# Patient Record
Sex: FEMALE | Race: WHITE
[De-identification: names, ages, dates, MRNs, and addresses within clinical notes are randomized per-mention and may not be internally consistent; named-entity substitution may affect disease eponyms.]

---

## 2017-08-24 ENCOUNTER — HOSPITAL ENCOUNTER (EMERGENCY)
Dept: HOSPITAL 1 - ED | Age: 67
Discharge: HOME | End: 2017-08-24
Payer: MEDICARE

## 2017-08-24 VITALS — DIASTOLIC BLOOD PRESSURE: 77 MMHG | SYSTOLIC BLOOD PRESSURE: 115 MMHG

## 2017-08-24 DIAGNOSIS — Z79.899: ICD-10-CM

## 2017-08-24 DIAGNOSIS — Z79.84: ICD-10-CM

## 2017-08-24 DIAGNOSIS — E11.40: ICD-10-CM

## 2017-08-24 DIAGNOSIS — R59.0: Primary | ICD-10-CM

## 2017-08-24 LAB
ALBUMIN SERPL-MCNC: 3.8 G/DL (ref 3.4–5)
ALP SERPL-CCNC: 51 U/L (ref 46–116)
ALT SERPL-CCNC: 64 U/L (ref 14–59)
AST SERPL-CCNC: 60 U/L (ref 15–37)
BASOPHILS NFR BLD: 0.6 % (ref 0–2)
BILIRUB SERPL-MCNC: 0.47 MG/DL (ref 0.2–1)
BUN SERPL-MCNC: 9 MG/DL (ref 7–18)
CALCIUM SERPL-MCNC: 9.5 MG/DL (ref 8.5–10.1)
CHLORIDE SERPL-SCNC: 103 MMOL/L (ref 98–107)
CO2 SERPL-SCNC: 27.4 MMOL/L (ref 21–32)
CREAT SERPL-MCNC: 0.7 MG/DL (ref 0.6–1)
ERYTHROCYTE [DISTWIDTH] IN BLOOD BY AUTOMATED COUNT: 15.5 % (ref 11.5–14.5)
GFR SERPLBLD BASED ON 1.73 SQ M-ARVRAT: > 60 ML/MIN
GLUCOSE SERPL-MCNC: 82 MG/DL (ref 74–106)
MICROSCOPIC UR-IMP: YES
PLATELET # BLD: 247 X10^3MCL (ref 130–400)
POTASSIUM SERPL-SCNC: 4 MMOL/L (ref 3.5–5.1)
PROT SERPL-MCNC: 8.1 G/DL (ref 6.4–8.2)
RBC # UR STRIP.AUTO: NEGATIVE /UL
SODIUM SERPL-SCNC: 141 MMOL/L (ref 136–145)
UA SPECIFIC GRAVITY: <=1.005 (ref 1–1.03)

## 2017-10-23 ENCOUNTER — HOSPITAL ENCOUNTER (EMERGENCY)
Dept: HOSPITAL 1 - ED | Age: 67
Discharge: HOME | End: 2017-10-23
Payer: COMMERCIAL

## 2017-10-23 VITALS — SYSTOLIC BLOOD PRESSURE: 99 MMHG | DIASTOLIC BLOOD PRESSURE: 66 MMHG

## 2017-10-23 VITALS — BODY MASS INDEX: 25.11 KG/M2 | WEIGHT: 159.99 LBS | HEIGHT: 67 IN

## 2017-10-23 DIAGNOSIS — E11.40: ICD-10-CM

## 2017-10-23 DIAGNOSIS — E11.649: Primary | ICD-10-CM

## 2017-10-23 DIAGNOSIS — D72.829: ICD-10-CM

## 2017-10-23 LAB
ALBUMIN SERPL-MCNC: 3.8 G/DL (ref 3.4–5)
ALP SERPL-CCNC: 38 U/L (ref 46–116)
ALT SERPL-CCNC: 68 U/L (ref 14–59)
AST SERPL-CCNC: 41 U/L (ref 15–37)
BASOPHILS NFR BLD: 0.2 % (ref 0–2)
BILIRUB SERPL-MCNC: 0.41 MG/DL (ref 0.2–1)
BUN SERPL-MCNC: 19 MG/DL (ref 7–18)
CALCIUM SERPL-MCNC: 8.9 MG/DL (ref 8.5–10.1)
CHLORIDE SERPL-SCNC: 100 MMOL/L (ref 98–107)
CHOLEST SERPL-MCNC: 178 MG/DL (ref ?–200)
CHOLEST/HDLC SERPL: 3.8 MG/DL
CO2 SERPL-SCNC: 23.6 MMOL/L (ref 21–32)
CREAT SERPL-MCNC: 0.9 MG/DL (ref 0.6–1)
ERYTHROCYTE [DISTWIDTH] IN BLOOD BY AUTOMATED COUNT: 15.4 % (ref 11.5–14.5)
GFR SERPLBLD BASED ON 1.73 SQ M-ARVRAT: > 60 ML/MIN
GLUCOSE SERPL-MCNC: 246 MG/DL (ref 74–106)
HDLC SERPL-MCNC: 47 MG/DL (ref 40–60)
LIPASE SERPL-CCNC: 208 IU/L (ref 73–393)
PLATELET # BLD: 253 X10^3MCL (ref 130–400)
POTASSIUM SERPL-SCNC: 4.2 MMOL/L (ref 3.5–5.1)
PROT SERPL-MCNC: 7.3 G/DL (ref 6.4–8.2)
SODIUM SERPL-SCNC: 135 MMOL/L (ref 136–145)
T3 SERPL-MCNC: 1.13 NG/ML
T3RU NFR SERPL: 33 % UPTAKE (ref 30–39)
T4 FREE SERPL-MCNC: 0.99 NG/DL (ref 0.76–1.46)
T4 SERPL-MCNC: 8.7 UG/DL (ref 4.7–13.3)
T4/T3 UPTAKE INDEX SERPL: 2.9 UG/DL (ref 1.4–4.5)
TRIGL SERPL-MCNC: 89 MG/DL (ref ?–150)

## 2018-05-06 ENCOUNTER — HOSPITAL ENCOUNTER (EMERGENCY)
Dept: HOSPITAL 1 - ED | Age: 68
Discharge: HOME | End: 2018-05-06
Payer: COMMERCIAL

## 2018-05-06 VITALS
HEIGHT: 65 IN | BODY MASS INDEX: 26.49 KG/M2 | BODY MASS INDEX: 26.49 KG/M2 | HEIGHT: 65 IN | WEIGHT: 159 LBS | WEIGHT: 159 LBS

## 2018-05-06 VITALS — DIASTOLIC BLOOD PRESSURE: 81 MMHG | SYSTOLIC BLOOD PRESSURE: 128 MMHG

## 2018-05-06 DIAGNOSIS — E78.00: ICD-10-CM

## 2018-05-06 DIAGNOSIS — R60.0: Primary | ICD-10-CM

## 2018-05-06 DIAGNOSIS — E13.42: ICD-10-CM

## 2018-05-06 DIAGNOSIS — Z72.0: ICD-10-CM

## 2018-05-06 DIAGNOSIS — I10: ICD-10-CM

## 2018-10-31 ENCOUNTER — HOSPITAL ENCOUNTER (INPATIENT)
Dept: HOSPITAL 1 - ED | Age: 68
LOS: 1 days | Discharge: HOME | DRG: 205 | End: 2018-11-01
Attending: HOSPITALIST | Admitting: HOSPITALIST
Payer: COMMERCIAL

## 2018-10-31 VITALS
WEIGHT: 162 LBS | HEIGHT: 64 IN | WEIGHT: 162 LBS | HEIGHT: 64 IN | BODY MASS INDEX: 27.66 KG/M2 | BODY MASS INDEX: 27.66 KG/M2

## 2018-10-31 VITALS — DIASTOLIC BLOOD PRESSURE: 61 MMHG | SYSTOLIC BLOOD PRESSURE: 124 MMHG

## 2018-10-31 VITALS — DIASTOLIC BLOOD PRESSURE: 68 MMHG | SYSTOLIC BLOOD PRESSURE: 147 MMHG

## 2018-10-31 VITALS — SYSTOLIC BLOOD PRESSURE: 139 MMHG | DIASTOLIC BLOOD PRESSURE: 67 MMHG

## 2018-10-31 DIAGNOSIS — Z87.891: ICD-10-CM

## 2018-10-31 DIAGNOSIS — Z79.84: ICD-10-CM

## 2018-10-31 DIAGNOSIS — D68.69: ICD-10-CM

## 2018-10-31 DIAGNOSIS — I10: ICD-10-CM

## 2018-10-31 DIAGNOSIS — Z79.82: ICD-10-CM

## 2018-10-31 DIAGNOSIS — E83.39: ICD-10-CM

## 2018-10-31 DIAGNOSIS — N39.0: ICD-10-CM

## 2018-10-31 DIAGNOSIS — G90.9: ICD-10-CM

## 2018-10-31 DIAGNOSIS — E78.5: ICD-10-CM

## 2018-10-31 DIAGNOSIS — N17.0: ICD-10-CM

## 2018-10-31 DIAGNOSIS — E11.59: ICD-10-CM

## 2018-10-31 DIAGNOSIS — M94.0: Primary | ICD-10-CM

## 2018-10-31 DIAGNOSIS — R55: ICD-10-CM

## 2018-10-31 LAB
ALBUMIN SERPL-MCNC: 4 G/DL (ref 3.4–5)
ALP SERPL-CCNC: 66 U/L (ref 46–116)
ALT SERPL-CCNC: 27 U/L (ref 14–59)
AST SERPL-CCNC: 14 U/L (ref 15–37)
BASOPHILS NFR BLD: 0.5 % (ref 0–2)
BILIRUB SERPL-MCNC: 0.35 MG/DL (ref 0.2–1)
BUN SERPL-MCNC: 22 MG/DL (ref 7–18)
CALCIUM SERPL-MCNC: 8.7 MG/DL (ref 8.5–10.1)
CHLORIDE SERPL-SCNC: 104 MMOL/L (ref 98–107)
CHOLEST SERPL-MCNC: 190 MG/DL (ref ?–200)
CHOLEST/HDLC SERPL: 4.3 MG/DL
CO2 SERPL-SCNC: 27.9 MMOL/L (ref 21–32)
CREAT SERPL-MCNC: 1 MG/DL (ref 0.6–1)
ERYTHROCYTE [DISTWIDTH] IN BLOOD BY AUTOMATED COUNT: 17 % (ref 11.5–14.5)
GFR SERPLBLD BASED ON 1.73 SQ M-ARVRAT: 58 ML/MIN
GLUCOSE SERPL-MCNC: 235 MG/DL (ref 74–106)
HDLC SERPL-MCNC: 44 MG/DL (ref 40–60)
MAGNESIUM SERPL-MCNC: 2.2 MG/DL (ref 1.8–2.4)
PHOSPHATE SERPL-MCNC: 2.3 MG/DL (ref 2.5–4.9)
PLATELET # BLD: 278 X10^3MCL (ref 130–400)
POTASSIUM SERPL-SCNC: 3.8 MMOL/L (ref 3.5–5.1)
PROT SERPL-MCNC: 8 G/DL (ref 6.4–8.2)
SODIUM SERPL-SCNC: 144 MMOL/L (ref 136–145)
T3 SERPL-MCNC: 1.76 NG/ML
T3RU NFR SERPL: 31 % UPTAKE (ref 30–39)
T4 FREE SERPL-MCNC: 1.11 NG/DL (ref 0.76–1.46)
T4 SERPL-MCNC: 8.7 UG/DL (ref 4.7–13.3)
T4/T3 UPTAKE INDEX SERPL: 2.7 UG/DL (ref 1.4–4.5)
TRIGL SERPL-MCNC: 156 MG/DL (ref ?–150)

## 2018-11-01 VITALS — SYSTOLIC BLOOD PRESSURE: 144 MMHG | DIASTOLIC BLOOD PRESSURE: 70 MMHG

## 2018-11-01 VITALS — DIASTOLIC BLOOD PRESSURE: 64 MMHG | SYSTOLIC BLOOD PRESSURE: 135 MMHG

## 2018-11-01 VITALS — DIASTOLIC BLOOD PRESSURE: 67 MMHG | SYSTOLIC BLOOD PRESSURE: 133 MMHG

## 2018-11-01 LAB
AMPHETAMINES UR QL SCN: (no result)
BASOPHILS NFR BLD: 0.8 % (ref 0–2)
BUN SERPL-MCNC: 16 MG/DL (ref 7–18)
CALCIUM SERPL-MCNC: 8.8 MG/DL (ref 8.5–10.1)
CHLORIDE SERPL-SCNC: 105 MMOL/L (ref 98–107)
CO2 SERPL-SCNC: 27.1 MMOL/L (ref 21–32)
CREAT SERPL-MCNC: 0.7 MG/DL (ref 0.6–1)
ERYTHROCYTE [DISTWIDTH] IN BLOOD BY AUTOMATED COUNT: 17.2 % (ref 11.5–14.5)
GFR SERPLBLD BASED ON 1.73 SQ M-ARVRAT: > 60 ML/MIN
GLUCOSE SERPL-MCNC: 114 MG/DL (ref 74–106)
MICROSCOPIC UR-IMP: YES
PHOSPHATE SERPL-MCNC: 3.6 MG/DL (ref 2.5–4.9)
PLATELET # BLD: 264 X10^3MCL (ref 130–400)
POTASSIUM SERPL-SCNC: 4.2 MMOL/L (ref 3.5–5.1)
RBC # UR STRIP.AUTO: NEGATIVE /UL
SODIUM SERPL-SCNC: 139 MMOL/L (ref 136–145)
UA SPECIFIC GRAVITY: >=1.03 (ref 1–1.03)

## 2019-08-22 ENCOUNTER — HOSPITAL ENCOUNTER (INPATIENT)
Dept: HOSPITAL 1 - ED | Age: 69
LOS: 2 days | Discharge: HOME | DRG: 392 | End: 2019-08-24
Attending: FAMILY MEDICINE | Admitting: FAMILY MEDICINE
Payer: COMMERCIAL

## 2019-08-22 VITALS — DIASTOLIC BLOOD PRESSURE: 67 MMHG | SYSTOLIC BLOOD PRESSURE: 146 MMHG

## 2019-08-22 VITALS — HEIGHT: 63 IN | WEIGHT: 169 LBS | BODY MASS INDEX: 29.95 KG/M2

## 2019-08-22 VITALS — DIASTOLIC BLOOD PRESSURE: 56 MMHG | SYSTOLIC BLOOD PRESSURE: 126 MMHG

## 2019-08-22 DIAGNOSIS — Z87.891: ICD-10-CM

## 2019-08-22 DIAGNOSIS — D50.9: ICD-10-CM

## 2019-08-22 DIAGNOSIS — E87.1: ICD-10-CM

## 2019-08-22 DIAGNOSIS — E11.65: ICD-10-CM

## 2019-08-22 DIAGNOSIS — Z79.84: ICD-10-CM

## 2019-08-22 DIAGNOSIS — Z79.82: ICD-10-CM

## 2019-08-22 DIAGNOSIS — K52.9: Primary | ICD-10-CM

## 2019-08-22 DIAGNOSIS — E78.5: ICD-10-CM

## 2019-08-22 LAB
ALBUMIN SERPL-MCNC: 4 G/DL (ref 3.4–5)
ALP SERPL-CCNC: 59 U/L (ref 46–116)
ALT SERPL-CCNC: 26 U/L (ref 14–59)
AST SERPL-CCNC: 23 U/L (ref 15–37)
BASOPHILS NFR BLD: 0.6 % (ref 0–2)
BILIRUB SERPL-MCNC: 0.4 MG/DL (ref 0.2–1)
BUN SERPL-MCNC: 10 MG/DL (ref 7–18)
BUN SERPL-MCNC: 9 MG/DL (ref 7–18)
CALCIUM SERPL-MCNC: 8 MG/DL (ref 8.5–10.1)
CALCIUM SERPL-MCNC: 8.3 MG/DL (ref 8.5–10.1)
CHLORIDE SERPL-SCNC: 81 MMOL/L (ref 98–107)
CHLORIDE SERPL-SCNC: 86 MMOL/L (ref 98–107)
CHOLEST SERPL-MCNC: 168 MG/DL (ref ?–200)
CHOLEST/HDLC SERPL: 4.5 MG/DL
CO2 SERPL-SCNC: 20.4 MMOL/L (ref 21–32)
CO2 SERPL-SCNC: 23.4 MMOL/L (ref 21–32)
CREAT SERPL-MCNC: 0.7 MG/DL (ref 0.6–1)
CREAT SERPL-MCNC: 0.7 MG/DL (ref 0.6–1)
ERYTHROCYTE [DISTWIDTH] IN BLOOD BY AUTOMATED COUNT: 17.7 % (ref 11.5–14.5)
GFR SERPLBLD BASED ON 1.73 SQ M-ARVRAT: > 60 ML/MIN
GFR SERPLBLD BASED ON 1.73 SQ M-ARVRAT: > 60 ML/MIN
GLUCOSE SERPL-MCNC: 110 MG/DL (ref 74–106)
GLUCOSE SERPL-MCNC: 150 MG/DL (ref 74–106)
HDLC SERPL-MCNC: 37 MG/DL (ref 40–60)
LIPASE SERPL-CCNC: 90 IU/L (ref 73–393)
PLATELET # BLD: 323 X10^3MCL (ref 130–400)
POTASSIUM SERPL-SCNC: 3.5 MMOL/L (ref 3.5–5.1)
POTASSIUM SERPL-SCNC: 4.2 MMOL/L (ref 3.5–5.1)
PROT SERPL-MCNC: 7.3 G/DL (ref 6.4–8.2)
SODIUM SERPL-SCNC: 116 MMOL/L (ref 136–145)
SODIUM SERPL-SCNC: 122 MMOL/L (ref 136–145)
T3 SERPL-MCNC: 0.95 NG/ML
T3RU NFR SERPL: 34 % UPTAKE (ref 30–39)
T4 FREE SERPL-MCNC: 1.27 NG/DL (ref 0.76–1.46)
T4 SERPL-MCNC: 10.4 UG/DL (ref 4.7–13.3)
T4/T3 UPTAKE INDEX SERPL: 3.5 UG/DL (ref 1.4–4.5)
TRIGL SERPL-MCNC: 272 MG/DL (ref ?–150)

## 2019-08-22 PROCEDURE — G0378 HOSPITAL OBSERVATION PER HR: HCPCS

## 2019-08-23 VITALS — DIASTOLIC BLOOD PRESSURE: 46 MMHG | SYSTOLIC BLOOD PRESSURE: 105 MMHG

## 2019-08-23 VITALS — SYSTOLIC BLOOD PRESSURE: 136 MMHG | DIASTOLIC BLOOD PRESSURE: 50 MMHG

## 2019-08-23 VITALS — SYSTOLIC BLOOD PRESSURE: 106 MMHG | DIASTOLIC BLOOD PRESSURE: 54 MMHG

## 2019-08-23 VITALS — SYSTOLIC BLOOD PRESSURE: 116 MMHG | DIASTOLIC BLOOD PRESSURE: 58 MMHG

## 2019-08-23 LAB
BASOPHILS NFR BLD: 0.7 % (ref 0–2)
BUN SERPL-MCNC: 10 MG/DL (ref 7–18)
BUN SERPL-MCNC: 9 MG/DL (ref 7–18)
CALCIUM SERPL-MCNC: 7.7 MG/DL (ref 8.5–10.1)
CALCIUM SERPL-MCNC: 8.6 MG/DL (ref 8.5–10.1)
CHLORIDE SERPL-SCNC: 91 MMOL/L (ref 98–107)
CHLORIDE SERPL-SCNC: 97 MMOL/L (ref 98–107)
CO2 SERPL-SCNC: 21.3 MMOL/L (ref 21–32)
CO2 SERPL-SCNC: 24.3 MMOL/L (ref 21–32)
CREAT SERPL-MCNC: 0.7 MG/DL (ref 0.6–1)
CREAT SERPL-MCNC: 0.9 MG/DL (ref 0.6–1)
ERYTHROCYTE [DISTWIDTH] IN BLOOD BY AUTOMATED COUNT: 18 % (ref 11.5–14.5)
GFR SERPLBLD BASED ON 1.73 SQ M-ARVRAT: > 60 ML/MIN
GFR SERPLBLD BASED ON 1.73 SQ M-ARVRAT: > 60 ML/MIN
GLUCOSE SERPL-MCNC: 123 MG/DL (ref 74–106)
GLUCOSE SERPL-MCNC: 128 MG/DL (ref 74–106)
MAGNESIUM SERPL-MCNC: 2 MG/DL (ref 1.8–2.4)
MICROSCOPIC UR-IMP: YES
PHOSPHATE SERPL-MCNC: 3.2 MG/DL (ref 2.5–4.9)
PLATELET # BLD: 298 X10^3MCL (ref 130–400)
POTASSIUM SERPL-SCNC: 3.6 MMOL/L (ref 3.5–5.1)
POTASSIUM SERPL-SCNC: 4.1 MMOL/L (ref 3.5–5.1)
RBC # UR STRIP.AUTO: NEGATIVE /UL
RBC MORPH BLD: (no result)
SODIUM SERPL-SCNC: 126 MMOL/L (ref 136–145)
SODIUM SERPL-SCNC: 132 MMOL/L (ref 136–145)
UA SPECIFIC GRAVITY: <=1.005 (ref 1–1.03)

## 2019-08-24 VITALS — SYSTOLIC BLOOD PRESSURE: 117 MMHG | DIASTOLIC BLOOD PRESSURE: 60 MMHG

## 2019-08-24 VITALS — SYSTOLIC BLOOD PRESSURE: 119 MMHG | DIASTOLIC BLOOD PRESSURE: 53 MMHG

## 2019-08-24 VITALS — SYSTOLIC BLOOD PRESSURE: 126 MMHG | DIASTOLIC BLOOD PRESSURE: 48 MMHG

## 2019-08-24 LAB
BASOPHILS NFR BLD: 1 % (ref 0–2)
BUN SERPL-MCNC: 12 MG/DL (ref 7–18)
CALCIUM SERPL-MCNC: 8.9 MG/DL (ref 8.5–10.1)
CHLORIDE SERPL-SCNC: 99 MMOL/L (ref 98–107)
CO2 SERPL-SCNC: 22.7 MMOL/L (ref 21–32)
CREAT SERPL-MCNC: 0.8 MG/DL (ref 0.6–1)
ERYTHROCYTE [DISTWIDTH] IN BLOOD BY AUTOMATED COUNT: 18.1 % (ref 11.5–14.5)
GFR SERPLBLD BASED ON 1.73 SQ M-ARVRAT: > 60 ML/MIN
GLUCOSE SERPL-MCNC: 130 MG/DL (ref 74–106)
MAGNESIUM SERPL-MCNC: 2.3 MG/DL (ref 1.8–2.4)
PHOSPHATE SERPL-MCNC: 3.9 MG/DL (ref 2.5–4.9)
PLATELET # BLD: 364 X10^3MCL (ref 130–400)
POTASSIUM SERPL-SCNC: 3.8 MMOL/L (ref 3.5–5.1)
SODIUM SERPL-SCNC: 137 MMOL/L (ref 136–145)

## 2025-07-31 NOTE — NUR
1. Recommend continue with CCHO diet.
A+OX4, NO RESPRIATORY DISTRESS NOTED, MEDSURG, PULSES MODERATEA AND EQUAL QUINN,
EDEMA LLE, LUNG SOUNDS CTA, TOELRATING RA, BOWEL SOUNDS ACTIVE, VOIDING
FREELY, GENERALIZED WEAKNESS, AMBULATORY, SKIN INTACT, IV IN R HAND WITH NS @
80 ML/HR, SITE WNL.
ALL NEEDS MET AT THIS TIME. FAMILY AT BEDSIDE.
ATTEMPTED TO CALL REPORT TO [employer]. PER MONITOR TECH, WILL HAVE HER CALL BACK.
Afebrile. No significant change in condition noted. Denies pain. In no
apparent distress.
Awake and verbally responsive. No respiratory distress noted on room air. c/o
headache, tylenol given as ordered with relief. Kept comfortable. Call light
within reach. Will cont.to monitor.
DR PRATHER AT BEDSIDE TO SPEAK WITH PT AND DAUGHTER REGARDING PLAN OF CARE FOR
ADMISSION.
ENDORSED ALL CARE TO RESOURCE NURSE INO
ENDORSED CARE TO SEBASTIAN NIXON.
HAND-OFF REPORT TO SEBASTIAN LAMBERT TO ASSUME CARE
I RECEIVED A  CALL FROM [place] REGARDING THIS PT NA LEVEL 116 I INFORMED DR PRATHER
I am ok with her restarting Lamictal. Whenever she is ready, she can let me know and I can resend the prescription.   
IV MAINTENANCE FLUIDS INITIATED PER ORDER, PT VERBALIZED UNDERSTANDING OF
MEDICATION PRIOR TO ADMINISTRATION. PT CURRENTLY REPORTING HEADACHE PAIN. MD
MADE AWARE.
Initial Nutrition Assessment: Mona Crouch 219-B
 
Dx: Abdominal pain, diarrhea and hyponatremia
PMHx: HTN,DM,Hyperlipidemia and Neuropathy
PSHx: Left groin abscess I and D
Labs: () BH, H/H:11.1/34L, A1c:8.5
Meds: Aspirin, Cozaar, Fosamax, Humulin, Lopid, Neurontin, Norco, NS IV,
Tylenol, Vitamin D, Zofran
Diet: CCHO
PO Intake:  () B:20% L:20% D:90% () B:100%
Ht: 63in, 5'3"   Wt: 169#, 76.657kg  BMI:29.9kg/m2 (overweight)     Bed
scale: unable to weigh due to pt sitting up in a chair eating lunch
IBW:115#, 52kg %IBW:147%   UBW: 169# per pt
Age: 70 y/o female
Food Allergies: NKFA
Skin:intact Jesús: 19
Edema: None
GI:loose stools per RN assesment . Last BM: 
Nursing Trigger: N/V/D>3days
Per H&P, presented to the ED with c/o diarrhea for the past week, with
reported episodes of loose stools between 6-7x/day. Pt with reported decreased
intake of solid food for the past week but drank gatorade.
Per progress note , sodium 126. NS changed from 100ml/hr to 50ml/hr. CBC
ordered: pending. Pt reports to feeling better with no loose stools overnight.
Pt wanted to go akin but MD explained risk of going home with low Na. Pt agreed
to stay.
During visit, observed patient siting up in a chair eating her lunch. Pt's son
helped translate. Pt reports to having a good appetite with some loose stools,
but improved as compared to the previous day.
 
Problem with:  N/V/C: no D: loose stools, improving per pt
Problems with: Chewing/Swallowing:  No
Current appetite: Good
Recent wt change:no  %wt change:0%
Vitamin/Supplement use: No
Special diet at home: regular
Physical activity:no
Nutrition education given (specify specific nutrition education and handout
given): High fiber nutrition therapy in Romanian. Went over what foods to
include when yo u have diarrhea and importance of staying hydrated.
 
Food-drug interactions? Norco  Education given: verbal diet education on how
pain medication can cause constipation
 
 Estimated Nutritional Needs Based on ideal body weight 52kg
 Energy: 1300-1560kcal/d  (25-30kcal/kg for maintenance)
 Protein:52-62g/d         (1-1.2g/kg for geriatric maintenanc)
 Fluid:1560-1820ml/d      (30-35ml/kg for diarrhea) or per doctor
 
Nutrition Diagnosis
1. Altered GI function related to gastroenteritis as evidenced by loose
stools.
2. Altered nutrition related labs related to endocrine dysfunction as
evidenced by A1c:8.5.
Intervention
1. Recommend continue with CCHO diet.
Monitor/Evaluate
Goal: PO intake at least 75% of estimated needs
Monitor: PO intake, Labs, GI function
F/U in 3-5 days as moderate risk:-
MOTRIN ADMINISTERED FOR HEADACHE 3/10. PT STATES PAIN IS "NOT TOO MUCH".
FAMILY AT BEDSIDE. WILL MONITOR.
PT AND SON GIVEN DC INSTRUCTIONS, VERBALIZED UNDERSTANDING, IV REMOVED FROM R
HAND WITH CATHETER INTACT, PT OFF UNIT AMBULATING INDEPENDENTLY WITH ALL
BELONGINGS ESCORTED BY CNA AND SON.
PT ASKED TO GO TO RESTROOM. PT AMBULATED TO RESTROOM AND BACK WITH NO ASSIST
AND STEADY GAIT. PT SPEAKING IN FULL SENTANCES. NO NEUROLOGICAL DEFICITS NOTED
AT THIS MOMENT.
PT BROUGHT IN BY DAUGHTER WITH C/O DIARRHEA AND R ARM PAIN X4 DAYS. 
AT BEDSIDE PT IS AAOX4. RESPS E/U. SKIN IS PINK, WARM AND DRY. PERRLA.
PT PLACED ON MONITOR. BED RAILS UP X1 FOR SAFETY. PT ORIENTED TO ROOM, USE OF
CALL BELL AND BED IN LOWEST POSITION. PT IS CALM AND COOPERATIVE.
PT AMBULATED FROM LOBBY TO ED WITH STEADY GAIT. PT AWAITING MSE.
PT COMPLAINING OF SLIGHT HEADACHE. PT DOES NOT WANT TYLENOL OR NORCO FOR PAIN.
PT PREFERS MOTRIN. DR ANDREW CARRANZA.
PT LAYIND DOWN IN BED WITH EYES CLOSED. BREATHING EVEN AND UNLABORED ON RA. NO
ACUTE DISTRESS NOTED. BED AT LOWEST SETTING. SIDE RAILS X2 UP. CALL LIGHT
WITHING REACH.
PT RESTING IN A POSITION OF COMFORFT. PT AAOX1. RESPS EVEN AND UNLABORED. CALL
LIGHT WITHIN REACH. BED IN LOW POSITION WITH SIDE RAILS UP X2. APPEARS TO BE NO
DISTRESS AT THIS TIME. NO CHANGE IN PT STATUS. WILL CONITINUE TO MONITOR.
GREGORY AT BEDSIDE.
PT RESTING IN BED, NO RESPIRATORY DISTRESS NOTED, DENIES PAIN, CALL LIGHT
WITHIN REACH.
PT RESTING IN BED, NO RESPRIATORY DISTRESS NOTED, DENIES PAIN, DAUGHTER AT
BEDSIDE, CALL LIGHT WITHIN REACH.
PT RESTING WITH NO DISCOMFORT NOTED. PT REPORTS RELIEF AFTER MOTRIN
ADMINISTRATION. PT IN BED KNITTING. WILL MONITOR.
PT RETURNED FROM CT. AWAKE AND ALERT, RESP E/U, NAD NOTED. DAUGHTER REMAINS AT
BEDSIDE.
PT SITTING IN CHAIR AT BEDSIDE, NO RESPRIATORY DISTRESS NOTED, DENIES PAIN,
DENIES NAUSEA, CALL LIGHT WITHIN REACH. FAMILY AT BEDSIDE.
PT SITTING UP IN GURNEY COMFORTABLY. RESP E/U, NAD NOTED. CALL LIGHT IN REACH.
PT SLEPT AT INTERVALS THROGHOUT THE NIGHT. BREATHING EVEN AND UNLABORED ON RA.
NO ACUTE DISTRESS NOTED. NO SIGNIFICANT CHANGE DURING SHIFT. ALL NEEDS
ASSESSED AND ATTENDED TO. IV TO RH INFUSING NS AT 50 ML/HR. SITE FREE FROM
REDNESS AND SWELLING. BED AT LOWEST SETTING. SIDE RAILS X2 UP. CALL LIGHT
WITHING REACH. WILL ENDORSE CARE TO AM NURSE.
PT STABLE AT THIS TIME. ALL NEEDS MET THROUGHOUT SHIFT. WILL CONTINUE TO
MONITOR AND ENDORSE CARE TO NIGHT SHIFT.
PT TRANSFERED TO CT VIA GURNEY IN STABLE CONDITION. AAOX4, RESPS E/U, SKIN IS
PINK WARM AND DRY.
RECEIVED PATIENT FROM ED VIA GUERNEY. PATIENT AMBULATED TO BED ALONE. GAIT
STEADY. VITALS TAKEN AND STABLE (SEE DOCUMENTATION). PT ON ROOM AIR WITH NO
DISTRESS NOTED. ORIENTED PATIENT TO ROOM AND EDUCATED ON THE USE OF CALL LIGHT
FOR ASSISTANCE. IV TO RT HAND IS PATENT AND INTACT. NO REDNESS OR PAIN. PT C/O
HEADACHE 8/10 UNRELIEVED BY MOTRIN GIVEN IN ED. ALL QUESTIONS AND CONCERNS
ADDRESSED.
RECEIVED PT FROM NIGHT SHIFT. PT AWAKE, ALERT. A/OX4. PT ON ROOM AIR WITH NO
RESP DISTRESS NOTED. LUNGS CTA. IV ACCESS RH CDI INFUSING NS AT 100ML/HR.
PERIPHERAL PULSES PALPABLE, NO EDEMA NOTED. PT NOTED TO HAVE GENERALIZED
WEAKNESS. PT DENIES ANY PAIN, HEADACHE OR CHEST PAIN AT THIS TIME. SAFETY
MEASURES IN PLACE, BED LOW AND LOCKED. CALL LIGHT WITHIN REACH.
RECEIVED PT VIA GUERNEY FROM E/D, ACCOMPANIED BY TRANSPORTER. PT A/A/O X 4,
CALM, COOPERATIVE; C/O INTERMITTENT THROBBING H/A 8/10; ON SEIZURE PRECAUTIONS
FOR NA+ 116; WEARS GLASSES (W/ PT). GENERALIZED WEAKNESS (R > L), ABLE TO
AMBULATE BY HERSELF; FALL RISK PROTOCOL IN PLACE; NOTED MISSING THUMB (SINCE
15Y AGO). DENIES CHEST PAIN OR DISCOMFORT AT THIS TIME, C/O MILD DIZZINESS.
QUINN RADIAL AND PEDAL PULSES PRESENT, +1 PITTING EDEMA TO BLE, CAP REFILL < 3
SECS, SCD BY BEDSIDE. NO ACUTE RESPIRATORY DISTRESS NOTED. ABD SOFT, ROUND,
NON-TENDER, NORMOACTIVE BOWEL SOUNDS X 4 QUADS, LAST BM 08/22/19, DIARRHEA; PT
W/ FULL UPPER AND PARTIAL LOWER DENTURES. IV SITE RH 20G, CDI. ORIENTED PT TO
ROOM, BED CONTROLS, CALL LIGHT SYSTEM. SIDE RAILS UP X 2, BED IN LOW POSITION.
WILL ENDORSE TO SEBASTIAN YATES.
RECEIVED REPORT FROM AM NURSE. PT LAYING DOWN IN BED WITH FAMILY AT BEDSIDE.
PT AAOX4, FOLLOW COMMANDS. ABLE TO MAKE NEEDS KNOWN. MED-JANEE. DENIES
CP/PRESSURE AT THIS TIME. PALPABLE PULSES TO BLE. TRACE EDEMA TO LLE NOTED.
LUNG SOUNDS CTA. BREATHING EVEN AND UNLABORED ON RA. NO ACUTE DISTRES NOTED.
NO SOB NOTED. ABD SOFT AND NONDISTENDED. ACTIVE BS X4 QUAD. DENIES N/V/D.
VOIDS FREELY BRP. GENERALIZED WEAKNESS. AMBULATORY. IV TO RH INFUSING NS AT
50ML/HR. SITE FREE FROM REDNESS AND SWELLING. BED AT LOWEST SETTING. SIDE
RAILS X2 UP. CALL LIGHT WITHING REACH. WILL CONTINUE TO MONITOR.
RECEIVED REPORT FROM PAULA CORTES IN ED. AWAITING PATIENT ARRIVAL TO FLOOR.
REPORT CALLED TO SEBASTIAN YATES TO ASSUME CARE FOR PT.
SEBASTIAN LAMBERT AMBULATED PT TO ED RESTROOM. AMBULATED WITH STEADY GAIT, NAD NOTED.
UA CAME BACK POSITIVE. DR LOCKHART MADE AWARE. NO NEW ORDERS RECEIVED.
Photo Preface (Leave Blank If You Do Not Want): Photographs were obtained today
Detail Level: Zone